# Patient Record
Sex: MALE | Employment: OTHER | URBAN - METROPOLITAN AREA
[De-identification: names, ages, dates, MRNs, and addresses within clinical notes are randomized per-mention and may not be internally consistent; named-entity substitution may affect disease eponyms.]

---

## 2017-02-07 ENCOUNTER — OFFICE VISIT (OUTPATIENT)
Dept: ORTHOPEDIC SURGERY | Age: 72
End: 2017-02-07

## 2017-02-07 VITALS
WEIGHT: 180 LBS | SYSTOLIC BLOOD PRESSURE: 116 MMHG | TEMPERATURE: 98.1 F | DIASTOLIC BLOOD PRESSURE: 68 MMHG | BODY MASS INDEX: 27.28 KG/M2 | HEART RATE: 62 BPM | HEIGHT: 68 IN

## 2017-02-07 DIAGNOSIS — M19.072 PRIMARY OSTEOARTHRITIS OF LEFT ANKLE: Primary | ICD-10-CM

## 2017-02-07 DIAGNOSIS — M25.572 CHRONIC PAIN OF LEFT ANKLE: ICD-10-CM

## 2017-02-07 DIAGNOSIS — G89.29 CHRONIC PAIN OF LEFT ANKLE: ICD-10-CM

## 2017-02-07 DIAGNOSIS — I73.9 PVD (PERIPHERAL VASCULAR DISEASE) (HCC): ICD-10-CM

## 2017-02-07 RX ORDER — ATENOLOL 25 MG/1
25 TABLET ORAL DAILY
COMMUNITY

## 2017-02-07 RX ORDER — TRAMADOL HYDROCHLORIDE 50 MG/1
50 TABLET ORAL
COMMUNITY

## 2017-02-07 RX ORDER — BISMUTH SUBSALICYLATE 262 MG
1 TABLET,CHEWABLE ORAL DAILY
COMMUNITY

## 2017-02-07 RX ORDER — MELOXICAM 15 MG/1
15 TABLET ORAL DAILY
Qty: 30 TAB | Refills: 0 | Status: SHIPPED | OUTPATIENT
Start: 2017-02-07 | End: 2017-02-22 | Stop reason: SDUPTHER

## 2017-02-07 RX ORDER — TAMSULOSIN HYDROCHLORIDE 0.4 MG/1
0.4 CAPSULE ORAL DAILY
COMMUNITY

## 2017-02-07 RX ORDER — DILTIAZEM HYDROCHLORIDE 240 MG/1
CAPSULE, EXTENDED RELEASE ORAL DAILY
COMMUNITY

## 2017-02-07 NOTE — MR AVS SNAPSHOT
Visit Information Date & Time Provider Department Dept. Phone Encounter #  
 2/7/2017  9:40 AM Bernard Eddy, [address] [facility] [phone] [id number] Upcoming Health Maintenance Date Due DTaP/Tdap/Td series (1 - Tdap) 2/11/1964 FOBT Q 1 YEAR AGE 50-75 2/11/1993 ZOSTER VACCINE AGE 60> 2/11/2003 GLAUCOMA SCREENING Q2Y 2/11/2008 Pneumococcal 65+ Low/Medium Risk (1 of 2 - PCV13) 2/11/2008 MEDICARE YEARLY EXAM 2/11/2008 INFLUENZA AGE 9 TO ADULT 8/1/2016 Allergies as of 2/7/2017  Review Complete On: 2/7/2017 By: Chalo Patricia Not on File Current Immunizations  Never Reviewed No immunizations on file. Not reviewed this visit You Were Diagnosed With   
  
 Codes Comments Primary osteoarthritis of left ankle    -  Primary ICD-10-CM: Y20.336 ICD-9-CM: 715.17 Chronic pain of left ankle     ICD-10-CM: M25.572, G89.29 ICD-9-CM: 719.47, 338.29 Vitals BP Pulse Temp Height(growth percentile) Weight(growth percentile) BMI  
 116/68 62 98.1 °F (36.7 °C) (Oral) 5' 8\" (1.727 m) 180 lb (81.6 kg) 27.37 kg/m2 Smoking Status Former Smoker Vitals History BMI and BSA Data Body Mass Index Body Surface Area  
 27.37 kg/m 2 1.98 m 2 Preferred Pharmacy Pharmacy Name Phone [facility] [address], [address] [place] [address] [phone] Your Updated Medication List  
  
   
This list is accurate as of: 2/7/17 10:44 AM.  Always use your most recent med list.  
  
  
  
  
 atenolol 25 mg tablet Commonly known as:  TENORMIN Take 25 mg by mouth daily. COLACE 50 mg capsule Generic drug:  docusate sodium Take 50 mg by mouth two (2) times a day. dilTIAZem  mg XR capsule Commonly known as:  DILACOR XR Take  by mouth daily. multivitamin tablet Commonly known as:  ONE A DAY Take 1 Tab by mouth daily. tamsulosin 0.4 mg capsule Commonly known as:  FLOMAX Take 0.4 mg by mouth daily. traMADol 50 mg tablet Commonly known as:  ULTRAM  
Take 50 mg by mouth every six (6) hours as needed for Pain. We Performed the Following AMB POC XRAY, ANKLE; COMPLETE, 3+ VIE [63645 CPT(R)] Patient Instructions Please follow up after CT. You are advised to contact us if your condition worsens. An CT has been ordered for you. A [facility] will be contacting you to schedule the appointment as soon as it has been approved with your insurance company. Please schedule an appointment to follow up with the doctor or the physicians assistant after the CT has been conducted. Osteoarthritis: Care Instructions Your Care Instructions Arthritis is a common health problem in which the joints are inflamed. There are several kinds of arthritis. Osteoarthritis is caused by a breakdown of cartilage, the hard, thick tissue that cushions the joints. It causes pain, stiffness, and swelling, often in the spine, fingers, hips, and knees. Osteoarthritis can happen at any age, but it is most common in older people. Osteoarthritis never goes away completely, but it can be controlled. Medicine and home treatment can reduce the pain and prevent the arthritis from getting worse. Follow-up care is a key part of your treatment and safety. Be sure to make and go to all appointments, and call your doctor if you are having problems. Its also a good idea to know your test results and keep a list of the medicines you take. How can you care for yourself at home? · Take a warm shower or bath in the morning to relieve stiffness. Avoid sitting still afterwards. · If the joint is not swollen, use moist heat, like a warm, damp towel, for 20 to 30 minutes, 2 or 3 times a day. Do not use heat on a swollen joint.  
· If the joint is swollen, use ice or cold packs for 10 to 20 minutes, once an hour. Cold will help relieve pain and reduce inflammation. Put a thin cloth between the ice and your skin. · To prevent stiffness, gently move the joint through its full range of motion several times a day. · If the joint hurts, avoid activities that put a strain on it for a few days. Take rest breaks throughout the day. · Get regular exercise. Walking, swimming, yoga, biking, and water aerobics are good exercises that are gentle on the joints. · Reach and stay at a healthy weight. If you need to lose or maintain weight, regular exercise and a healthy diet will help. Extra weight can strain the joints, especially the knees and hips, and make the pain worse. Losing even a few pounds may help. · Take pain medicines exactly as directed. ¨ If the doctor gave you a prescription medicine for pain, take it as prescribed. ¨ If you are not taking a prescription pain medicine, ask your doctor if you can take an over-the-counter medicine. When should you call for help? Call your doctor now or seek immediate medical care if: · The pain is so bad that you cannot use the joint. · You have sudden back pain with weakness in your legs or loss of bowel or bladder control. · Your stools are black and tarlike or have streaks of blood. · You have severe pain and swelling in more than one joint. Watch closely for changes in your health, and be sure to contact your doctor if: 
· You have side effects from the medicines, like belly pain, ongoing heartburn, or nausea. · Joint pain continues for more than 6 weeks, and home treatment is not helping. Where can you learn more? Go to http://mark-tobias.info/. Enter K734 in the search box to learn more about \"Osteoarthritis: Care Instructions. \" Current as of: February 24, 2016 Content Version: 11.1 © 8445-4011 [employer].  Care instructions adapted under license by [employer] (which disclaims liability or warranty for this information). If you have questions about a medical condition or this instruction, always ask your healthcare professional. [address] any warranty or liability for your use of this information. Introducing [facility] SERVICES! Chepe Lewis introduces [email or web address] patient portal. Now you can access parts of your medical record, email your doctor's office, and request medication refills online. 1. In your internet browser, go to [email or web address] 2. Click on the First Time User? Click Here link in the Sign In box. You will see the New Member Sign Up page. 3. Enter your [email or web address] Access Code exactly as it appears below. You will not need to use this code after youve completed the sign-up process. If you do not sign up before the expiration date, you must request a new code. · [email or web address] Access Code: [email or web address] Expires: 5/8/2017 10:44 AM 
 
4. Enter the last four digits of your Social Security Number (xxxx) and Date of Birth (mm/dd/yyyy) as indicated and click Submit. You will be taken to the next sign-up page. 5. Create a [email or web address] ID. This will be your [email or web address] login ID and cannot be changed, so think of one that is secure and easy to remember. 6. Create a [email or web address] password. You can change your password at any time. 7. Enter your Password Reset Question and Answer. This can be used at a later time if you forget your password. 8. Enter your e-mail address. You will receive e-mail notification when new information is available in [address] 9. Click Sign Up. You can now view and download portions of your medical record. 10. Click the Download Summary menu link to download a portable copy of your medical information. If you have questions, please visit the Frequently Asked Questions section of the [email or web address] website. Remember, [email or web address] is NOT to be used for urgent needs. For medical emergencies, dial 911. Now available from your iPhone and Android! Please provide this summary of care documentation to your next provider. If you have any questions after today's visit, please call [phone].

## 2017-02-07 NOTE — PROGRESS NOTES
AMBULATORY PROGRESS NOTE      Patient: Kajal Lovell             MRN: [id number]     SSN: [id number] Body mass index is 27.37 kg/(m^2). YOB: 1945     AGE: 66 y.o. EX: male    PCP: No primary care provider on file. IMPRESSION/DIAGNOSIS AND TREATMENT PLAN     DIAGNOSES  1. Primary osteoarthritis of left ankle    2. PVD (peripheral vascular disease) ([facility] [address]    3. Chronic pain of left ankle        Orders Placed This Encounter    [43499] Ankle Min 3V    CT ANKLE LT WO CONT    LOWER EXT ART PVR MULT LEVEL SEG PRESSURES    meloxicam (MOBIC) 15 mg tablet      Kajal Lovell understands his diagnoses and the proposed plan. In this individual who has really failed conservative measures, he has been using an [place] brace and is still having severe pain and discomfort to the anterior portion of his left ankle. He has post-traumatic osteoarthritis of the left ankle. I am ordering a CT scan to assess the talus, as I saw some cystic changes to the posterior portion of talus and some osteoarthritic changes, of course, to the ankle. Upon his next visit, I will obtain weightbearing films of his knees to include the tibia and ankle. Full weight bearing films I would like to see so that I can assess his alignment of his ankle with weightbearing studies. I suspect, in this individual who is failing conservative measures, he is going to require surgery. He may, in fact, require ankle arthrodesis. The issues really are that he has had an anteromedial surgical scar from his ankle many years ago. He has some slight redness of the skin. He does have redness of the skin in this area that has been there for many years now. I cannot palpate the left DP pulse, but I can palpate the PT pulse. So, I think he is going to require noninvasive arterial studies.   Other nonoperative treatment measures may, and/or will, include a cortisone injection, as he has not had any cortisone injections to his ankle at this point in time. Additional recommendations are listed as above and as below. Plan:    1) CT: Left ankle  2) PVL Arterial Study: Assess circulation of BLE  3) Mobic 15 m PO every day; 30 tablets    RTO after CT; Cortisone injection at next visit    HPI [id number] Eryn Stewart IS A 66 y.o. male who presents to my outpatient office complaining of: left ankle pain. The patient presents to the office with his granddaughter, and he is wearing a left custom [place] brace. He reports that he has been having recurrent left tib/fib pain. He reports that the pain has become bad enough that he is unable to sleep. Patient was previously in pain management at [place], but has since been weaned off of 90 mg of morphine. Patient has h/o tibia/fibula fracture with plates and screw placement and removal. Patient sees Dr. Bernadette Marquez [facility] to monitor his circulation. He has an appointment with Dr. Maryam Robbins at 13:00. Filiberto Reece is alert/oriented (name, location, time) and follows commands well. he  is in no acute distress and his affect and mood are appropriate. Left ANKLE and FOOT       Gait: slow  Tenderness: mild across the dorsal aspect of his left tib/fib. Cutaneous: No rashes, skin patches, wounds, or abrasions to the lower legs           Warm and Normal color. No regions of expressible drainage. Medial Border of Tibia Region: absent           Skin color, texture, turgor normal. Normal.    Remote well healed surgical scar; slight redness in the distal tib/fib. Right le+ pitting edema of tib/fib. Venus stasis discoloration of distal 1/3 of tib/fib. Joint Motion: ROM Ankle: decreased  and Hindfoot: (ST,TN,CC Normal}, Forefoot toes:Normal  Neurologic Exam: Neuro: Motor: normal 5/5 strength in all tested muscle groups             Sensory: reduced sensation over well healed left dorsal tib/fib scar.   Contractures: Gastrocnemius or Achilles Contractures absent  Joint / Tendon Stability: No Ankle or Subtalar instability or joint laxity. No peroneal sublux ability or dislocation  Alignment:  Normal Foot Alignment with slight varus and  Flexible  Vascular: Normal Pulses/ NL Capillary refill, No evidence of DVT seen on physical exam.   No calf swelling, no tenderness to calf muscles. Right  DP pulse: +1, TP pulse: +1   Left DP pulse: cannot palpate  Lymphatic:  No Evidence of Lymphedema. CHART REVIEW     No past medical history on file. Current Outpatient Prescriptions   Medication Sig    multivitamin (ONE A DAY) tablet Take 1 Tab by mouth daily.  docusate sodium (COLACE) 50 mg capsule Take 50 mg by mouth two (2) times a day.  tamsulosin (FLOMAX) 0.4 mg capsule Take 0.4 mg by mouth daily.  dilTIAZem XR (DILACOR XR) 240 mg XR capsule Take  by mouth daily.  atenolol (TENORMIN) 25 mg tablet Take 25 mg by mouth daily.  traMADol (ULTRAM) 50 mg tablet Take 50 mg by mouth every six (6) hours as needed for Pain.  meloxicam (MOBIC) 15 mg tablet Take 1 Tab by mouth daily. No current facility-administered medications for this visit. Not on File  No past surgical history on file. Social History     Occupational History    Not on file. Social History Main Topics    Smoking status: Former Smoker    Smokeless tobacco: Not on file    Alcohol use Not on file    Drug use: Not on file    Sexual activity: Not on file     No family history on file. REVIEW OF SYSTEMS : 2/7/2017  ALL BELOW ARE Negative except : SEE HPI       Constitutional: Negative for fever, chills and weight loss. Neg Weigh Loss  Cardiovascular: Negative for chest pain, claudication and leg swelling. SOB, BURROUGHS   Gastrointestinal: Negative for  pain, N/V/D/C, Blood in stool or urine,dysuria, hematuria,        Incontinence, pelvic pain  Musculoskeletal: see HPI. Neurological: Negative for dizziness and weakness.                  Negative for headaches,Visual Changes, Confusion, Seizures,   Psychiatric/Behavioral: Negative for depression, memory loss and substance abuse. Extremities:  Negative for  hair changes, rash or skin lesion changes. Hematologic: Negative for Bleeding problems, bruising, pallor or swollen lymph nodes. Peripheral Vascular: No calf pain, vascular vein tenderness to calf pain              No calf throbbing, posterior knee throbbing pain    DIAGNOSTIC IMAGING      X RAYS AT [address] [place] [address]  2/7/2017    LEFT DISTAL TIB/FIB (DISTAL 1/3)    Bones and Joints: OLD HEALED DISTAL TIBIA AND FIBULA FX'S fractures: EVIDENCE OF PRIOR SCREW HOLES             No subluxations, or No dislocations  Alignment: SLIGHT VALGUS:  Soft Tissues: WNL Normal   Joints: Arthritis:  moderate present TO DORSAL TN REGION (SPURS) AND SOME OA TO ANKLE JOINT. CYSTIC CHANGES TO ANKLE AT POSTERIOR TALUS AND ANKLE  Mineralization: Suggests moderate Osteopenia    I have personally reviewed the results of the above study. The interpretation of this study is my professional opinion.       Abdirashid Jameson MD  2/7/2017  10:14 AM             Written by Kortney Fisher, as dictated by Joanne Arredondo MD.

## 2017-02-07 NOTE — PATIENT INSTRUCTIONS
Please follow up after CT. You are advised to contact us if your condition worsens. An CT has been ordered for you. A [employer] will be contacting you to schedule the appointment as soon as it has been approved with your insurance company. Please schedule an appointment to follow up with the doctor or the physicians assistant after the CT has been conducted. Osteoarthritis: Care Instructions  Your Care Instructions    Arthritis is a common health problem in which the joints are inflamed. There are several kinds of arthritis. Osteoarthritis is caused by a breakdown of cartilage, the hard, thick tissue that cushions the joints. It causes pain, stiffness, and swelling, often in the spine, fingers, hips, and knees. Osteoarthritis can happen at any age, but it is most common in older people. Osteoarthritis never goes away completely, but it can be controlled. Medicine and home treatment can reduce the pain and prevent the arthritis from getting worse. Follow-up care is a key part of your treatment and safety. Be sure to make and go to all appointments, and call your doctor if you are having problems. Its also a good idea to know your test results and keep a list of the medicines you take. How can you care for yourself at home? · Take a warm shower or bath in the morning to relieve stiffness. Avoid sitting still afterwards. · If the joint is not swollen, use moist heat, like a warm, damp towel, for 20 to 30 minutes, 2 or 3 times a day. Do not use heat on a swollen joint. · If the joint is swollen, use ice or cold packs for 10 to 20 minutes, once an hour. Cold will help relieve pain and reduce inflammation. Put a thin cloth between the ice and your skin. · To prevent stiffness, gently move the joint through its full range of motion several times a day. · If the joint hurts, avoid activities that put a strain on it for a few days. Take rest breaks throughout the day. · Get regular exercise. Walking, swimming, yoga, biking, and water aerobics are good exercises that are gentle on the joints. · Reach and stay at a healthy weight. If you need to lose or maintain weight, regular exercise and a healthy diet will help. Extra weight can strain the joints, especially the knees and hips, and make the pain worse. Losing even a few pounds may help. · Take pain medicines exactly as directed. ¨ If the doctor gave you a prescription medicine for pain, take it as prescribed. ¨ If you are not taking a prescription pain medicine, ask your doctor if you can take an over-the-counter medicine. When should you call for help? Call your doctor now or seek immediate medical care if:  · The pain is so bad that you cannot use the joint. · You have sudden back pain with weakness in your legs or loss of bowel or bladder control. · Your stools are black and tarlike or have streaks of blood. · You have severe pain and swelling in more than one joint. Watch closely for changes in your health, and be sure to contact your doctor if:  · You have side effects from the medicines, like belly pain, ongoing heartburn, or nausea. · Joint pain continues for more than 6 weeks, and home treatment is not helping. Where can you learn more? Go to http://mark-tobias.info/. Enter O278 in the search box to learn more about \"Osteoarthritis: Care Instructions. \"  Current as of: February 24, 2016  Content Version: 11.1  © 8415-7172 [employer]. Care instructions adapted under license by [employer] (which disclaims liability or warranty for this information). If you have questions about a medical condition or this instruction, always ask your healthcare professional. [address] any warranty or liability for your use of this information.

## 2017-02-07 NOTE — PROCEDURES
X RAYS AT [address] [place] [address]  2/7/2017    LEFT DISTAL TIB/FIB (DISTAL 1/3)    Bones and Joints: OLD HEALED DISTAL TIBIA AND FIBULA FX'S fractures: EVIDENCE OF PRIOR SCREW HOLES             No subluxations, or No dislocations  Alignment: SLIGHT VALGUS:  Soft Tissues: WNL Normal   Joints: Arthritis:  moderate present TO DORSAL TN REGION (SPURS) AND SOME OA TO ANKLE JOINT. CYSTIC CHANGES TO ANKLE AT POSTERIOR TALUS AND ANKLE  Mineralization: Suggests moderate Osteopenia    I have personally reviewed the results of the above study. The interpretation of this study is my professional opinion.       Lakeisha Barrera MD  2/7/2017  10:14 AM

## 2017-02-09 ENCOUNTER — TELEPHONE (OUTPATIENT)
Dept: ORTHOPEDIC SURGERY | Age: 72
End: 2017-02-09

## 2017-02-09 NOTE — TELEPHONE ENCOUNTER
Dr. Alexa Goetz does not want to prescribe any stronger pain medicine. He can use OTC Asper Cream with Lidocaine or Biofreeze. We can prescribe a different antiinflammatory medicine and/or refer the patient back to pain management. Patient should follow up in the office after CT and PVL studies completed.      Kayden Clark PA-C  2/9/2017   3:25 PM

## 2017-02-09 NOTE — TELEPHONE ENCOUNTER
Milagros Hedrick called on behalf of patient (ok per HIPPA), states current medication needs to be evaluated as patient is still not able to sleep due to pain - Mobin and Ultram is not effective enough and patient has asked her to see if we can prescribe something different. States he can tolerate pain during the day, but not at night.   Please advise Larry Finney at [phone]

## 2017-02-14 ENCOUNTER — HOSPITAL ENCOUNTER (OUTPATIENT)
Dept: CT IMAGING | Age: 72
Discharge: HOME | End: 2017-02-14
Attending: ORTHOPAEDIC SURGERY
Payer: MEDICARE

## 2017-02-14 ENCOUNTER — HOSPITAL ENCOUNTER (OUTPATIENT)
Dept: VASCULAR SURGERY | Age: 72
Discharge: HOME | End: 2017-02-14
Attending: ORTHOPAEDIC SURGERY
Payer: MEDICARE

## 2017-02-14 DIAGNOSIS — G89.29 CHRONIC PAIN OF LEFT ANKLE: ICD-10-CM

## 2017-02-14 DIAGNOSIS — M25.572 CHRONIC PAIN OF LEFT ANKLE: ICD-10-CM

## 2017-02-14 DIAGNOSIS — M19.072 PRIMARY OSTEOARTHRITIS OF LEFT ANKLE: ICD-10-CM

## 2017-02-14 PROCEDURE — 93923 UPR/LXTR ART STDY 3+ LVLS: CPT

## 2017-02-14 PROCEDURE — 73700 CT LOWER EXTREMITY W/O DYE: CPT

## 2017-02-14 NOTE — PROCEDURES
[facility] [facility]  *** FINAL REPORT ***    Name: Waylon Berrios  MRN: [id number]    Outpatient  : 1945  HIS Order #: [id number]  [id number] [facility] Visit #: [id number]  Date: 2017    TYPE OF TEST: Peripheral Arterial Testing    REASON FOR TEST    Right Leg  Segmentals: Normal                     mmHg  Brachial  High thigh  Low thigh  Calf             168  Posterior tibial 177  Dorsalis pedis   135  Peroneal  Metatarsal  Toe pressure     134  Doppler:  Ankle/Brachial: 1.55    Left Leg  Segmentals: Normal                     mmHg  Brachial         114  High thigh  Low thigh  Calf             173  Posterior tibial 174  Dorsalis pedis   159  Peroneal  Metatarsal  Toe pressure     115  Doppler:  Ankle/Brachial: 1.53    INTERPRETATION/FINDINGS  1. No evidence of significant peripheral arterial disease at rest in  the right leg. 2. No evidence of significant peripheral arterial disease at rest in  the left leg. 3. The right ankle/brachial index is 1.55 and the left ankle/brachial  index is 1.53. ADDITIONAL COMMENTS    I have personally reviewed the data relevant to the interpretation of  this  study.     TECHNOLOGIST: Jocelyn Polo, [facility], RVT/  Signed: 2017 11:06 AM    PHYSICIAN: Miquel Breen MD  Signed: 2017 02:28 PM

## 2017-02-22 DIAGNOSIS — G89.29 CHRONIC PAIN OF LEFT ANKLE: ICD-10-CM

## 2017-02-22 DIAGNOSIS — M25.572 CHRONIC PAIN OF LEFT ANKLE: ICD-10-CM

## 2017-02-22 DIAGNOSIS — M19.072 PRIMARY OSTEOARTHRITIS OF LEFT ANKLE: ICD-10-CM

## 2017-02-22 RX ORDER — MELOXICAM 15 MG/1
TABLET ORAL
Qty: 30 TAB | Refills: 0 | Status: SHIPPED | OUTPATIENT
Start: 2017-02-22

## 2017-02-27 ENCOUNTER — OFFICE VISIT (OUTPATIENT)
Dept: ORTHOPEDIC SURGERY | Age: 72
End: 2017-02-27

## 2017-02-27 VITALS
BODY MASS INDEX: 27.37 KG/M2 | DIASTOLIC BLOOD PRESSURE: 89 MMHG | WEIGHT: 180 LBS | HEART RATE: 70 BPM | TEMPERATURE: 98 F | SYSTOLIC BLOOD PRESSURE: 128 MMHG

## 2017-02-27 DIAGNOSIS — I73.9 PVD (PERIPHERAL VASCULAR DISEASE) (HCC): ICD-10-CM

## 2017-02-27 DIAGNOSIS — M19.072 PRIMARY OSTEOARTHRITIS OF LEFT ANKLE: Primary | ICD-10-CM

## 2017-02-27 RX ORDER — HYDROCODONE BITARTRATE AND ACETAMINOPHEN 5; 325 MG/1; MG/1
1-2 TABLET ORAL
Qty: 40 TAB | Refills: 0 | Status: SHIPPED | OUTPATIENT
Start: 2017-02-27

## 2017-02-27 NOTE — PATIENT INSTRUCTIONS
Please follow up in 3 weeks. You are advised to contact us if your condition worsens. Recommended: Miralax for constipation     Osteoarthritis: Care Instructions  Your Care Instructions    Arthritis is a common health problem in which the joints are inflamed. There are several kinds of arthritis. Osteoarthritis is caused by a breakdown of cartilage, the hard, thick tissue that cushions the joints. It causes pain, stiffness, and swelling, often in the spine, fingers, hips, and knees. Osteoarthritis can happen at any age, but it is most common in older people. Osteoarthritis never goes away completely, but it can be controlled. Medicine and home treatment can reduce the pain and prevent the arthritis from getting worse. Follow-up care is a key part of your treatment and safety. Be sure to make and go to all appointments, and call your doctor if you are having problems. Its also a good idea to know your test results and keep a list of the medicines you take. How can you care for yourself at home? · Take a warm shower or bath in the morning to relieve stiffness. Avoid sitting still afterwards. · If the joint is not swollen, use moist heat, like a warm, damp towel, for 20 to 30 minutes, 2 or 3 times a day. Do not use heat on a swollen joint. · If the joint is swollen, use ice or cold packs for 10 to 20 minutes, once an hour. Cold will help relieve pain and reduce inflammation. Put a thin cloth between the ice and your skin. · To prevent stiffness, gently move the joint through its full range of motion several times a day. · If the joint hurts, avoid activities that put a strain on it for a few days. Take rest breaks throughout the day. · Get regular exercise. Walking, swimming, yoga, biking, and water aerobics are good exercises that are gentle on the joints. · Reach and stay at a healthy weight. If you need to lose or maintain weight, regular exercise and a healthy diet will help.  Extra weight can strain the joints, especially the knees and hips, and make the pain worse. Losing even a few pounds may help. · Take pain medicines exactly as directed. ¨ If the doctor gave you a prescription medicine for pain, take it as prescribed. ¨ If you are not taking a prescription pain medicine, ask your doctor if you can take an over-the-counter medicine. When should you call for help? Call your doctor now or seek immediate medical care if:  · The pain is so bad that you cannot use the joint. · You have sudden back pain with weakness in your legs or loss of bowel or bladder control. · Your stools are black and tarlike or have streaks of blood. · You have severe pain and swelling in more than one joint. Watch closely for changes in your health, and be sure to contact your doctor if:  · You have side effects from the medicines, like belly pain, ongoing heartburn, or nausea. · Joint pain continues for more than 6 weeks, and home treatment is not helping. Where can you learn more? Go to http://mark-otbias.info/. Enter Y772 in the search box to learn more about \"Osteoarthritis: Care Instructions. \"  Current as of: February 24, 2016  Content Version: 11.1  © 1107-6290 [employer]. Care instructions adapted under license by [employer] (which disclaims liability or warranty for this information). If you have questions about a medical condition or this instruction, always ask your healthcare professional. [place] [address] any warranty or liability for your use of this information.

## 2017-02-27 NOTE — PROGRESS NOTES
AMBULATORY PROGRESS NOTE      Patient: Kajal Lovell             MRN: [id number]     SSN: [id number] Body mass index is 27.37 kg/(m^2). YOB: 1945     AGE: 74 y.o. EX: male    PCP: Angela Ochoa MD    IMPRESSION/DIAGNOSIS AND TREATMENT PLAN     DIAGNOSES  1. Primary osteoarthritis of left ankle    2. PVD (peripheral vascular disease) ([address]        Orders Placed This Encounter    HYDROcodone-acetaminophen () 5-325 mg per tablet      Kajal Lovell understands his diagnoses and the proposed plan. Plan:    1) Norco 5 m-2 QHS PRN; 40 tablets, 0 refills  2) Recommended Miralax   3) I anticipate a formal pain management consultation, as he used to go to pain management at [facility]. The CT scan of the ankle without contrast showed an old, healed fracture to the distal tibia and fibula. The ankle has some mild osteoarthritic changes. There are some OA changes seen to the medial gutter and lateral gutter, and ossicle seen to the medial malleolus region. There is moderate osteoarthritic changes across the talonavicular region as well. This was done at  [facility], CT done on 2017. RTO - 3 weeks    HPI AND EXAMINATION     Kajal Lovell IS A 74 y.o. male who presents to my outpatient office for follow up of primary osteoarthritis of the left ankle and PVD. At last visit, I ordered a left ankle CT, ordered bilateral PVL arterial studies, and prescribed Mobic 15 mg. I recommended a cortisone injection at last visit. The CT was completed on 2017 and reviewed in the office today. The PVL arterial studies were also reviewed. Patient reports that he continues to have discomfort through left ankle. The patient's daughter states that she removed her father from [facility] pain management because they kept prescribing morphine (associated symptom of constipation) with no relief. She states that she wanted to get a second opinion.      Patient has h/o tibia/fibula fracture with plates and screw placement and removal. Patient sees Dr. April Dye at [facility] to monitor his circulation.     Lisa Betancur is alert/oriented (name, location, time) and follows commands well. he is in no acute distress and his affect and mood are appropriate.      Left ANKLE and FOOT      Gait: slow  Tenderness: mild across the dorsal aspect of his left tib/fib. Cutaneous: No rashes, skin patches, wounds, or abrasions to the lower legs   Warm and Normal color. No regions of expressible drainage. Medial Border of Tibia Region: absent   Skin color, texture, turgor normal. Normal.   Remote well healed surgical scar; slight redness in the distal tib/fib. Right le+ pitting edema of tib/fib. Venus stasis discoloration of distal 1/3 of tib/fib. Joint Motion: ROM Ankle: decreased and Hindfoot: (ST,TN,CC Normal}, Forefoot toes:Normal  Neurologic Exam: Neuro: Motor: normal 5/5 strength in all tested muscle groups   Sensory: reduced sensation over well healed left dorsal tib/fib scar. Contractures: Gastrocnemius or Achilles Contractures absent  Joint / Tendon Stability: No Ankle or Subtalar instability or joint laxity. No peroneal sublux ability or dislocation  Alignment:  Normal Foot Alignment with slight varus and  Flexible  Vascular: Normal Pulses/ NL Capillary refill, No evidence of DVT seen on physical exam.   No calf swelling, no tenderness to calf muscles. Right DP pulse: +1, TP pulse: +1  Left DP pulse: cannot palpate  Lymphatic: No Evidence of Lymphedema. CHART REVIEW     No past medical history on file. Current Outpatient Prescriptions   Medication Sig    HYDROcodone-acetaminophen (NORCO) 5-325 mg per tablet Take 1-2 Tabs by mouth nightly as needed for Pain. Max Daily Amount: 2 Tabs.  multivitamin (ONE A DAY) tablet Take 1 Tab by mouth daily.  docusate sodium (COLACE) 50 mg capsule Take 50 mg by mouth two (2) times a day.     tamsulosin (FLOMAX) 0.4 mg capsule Take 0.4 mg by mouth daily.  dilTIAZem XR (DILACOR XR) 240 mg XR capsule Take  by mouth daily.  atenolol (TENORMIN) 25 mg tablet Take 25 mg by mouth daily.  traMADol (ULTRAM) 50 mg tablet Take 50 mg by mouth every six (6) hours as needed for Pain.  meloxicam (MOBIC) 15 mg tablet take 1 tablet by mouth once daily     No current facility-administered medications for this visit. Not on File  No past surgical history on file. Social History     Occupational History    Not on file. Social History Main Topics    Smoking status: Former Smoker    Smokeless tobacco: Not on file    Alcohol use Not on file    Drug use: Not on file    Sexual activity: Not on file     No family history on file. REVIEW OF SYSTEMS : 2/27/2017  ALL BELOW ARE Negative except : SEE HPI       Constitutional: Negative for fever, chills and weight loss. Neg Weigh Loss  Cardiovascular: Negative for chest pain, claudication and leg swelling. SOB, BURROUGHS   Gastrointestinal: Negative for  pain, N/V/D/C, Blood in stool or urine,dysuria, hematuria,        Incontinence, pelvic pain  Musculoskeletal: see HPI. Neurological: Negative for dizziness and weakness. Negative for headaches,Visual Changes, Confusion, Seizures,   Psychiatric/Behavioral: Negative for depression, memory loss and substance abuse. Extremities:  Negative for  hair changes, rash or skin lesion changes. Hematologic: Negative for Bleeding problems, bruising, pallor or swollen lymph nodes. Peripheral Vascular: No calf pain, vascular vein tenderness to calf pain              No calf throbbing, posterior knee throbbing pain    DIAGNOSTIC IMAGING     CT Results (most recent):    Results from Hospital Encounter encounter on 02/14/17   CT ANKLE LT WO CONT   Narrative CT scan of left ankle, without contrast:        INDICATION:    Chronic pain in left ankle. Primary osteoarthritis.         TECHNIQUE:    Contiguous 2.5 image as the axial sections of left ankle and proximal foot are  obtained without intravenous contrast.    Coronal and sagittal images are reformatted. All CT scans at this facility are performed using dose optimization technique as  appropriate to a  performed  examination, to include automated exposer control,  adjustment mA and / or  KV according to patient size (including appropriate  matching  for site specific examination), or use  of iterative  reconstruction  technique. COMPARISON STUDY: None. FINDINGS:    The study shows periosteal reaction with periosteal bone formation involving the  distal shaft of left tibia,. There are some radiolucent foci, indicating  previous insertion of orthopedic screws and later removal of the orthopedic  screws with remaining tracts. These findings are indicative of previous  internally fixed healed fracture in distal shaft of left tibia. Note is also  made of healed fracture in the distal shaft of left fibula approximately at the  junction of middle third and distal third of this bone. Currently at the left ankle joint there are mild osteoarthritic changes noted. The ankle mortise appears to be of normal shape and spacing. At the inferior aspect of medial malleolus of left tibia there is a corticated  ossicle measuring 6 mm in diameter. Another smaller ossicle measuring 3 mm in  diameter in this area. These ossicles may be due to old nonunited fracture or  accessory ossicles. At the medial aspect of the talus bone adjacent  to  the  ossicles there are osteoarthritic changes with sclerosis and mild spurring  noted. At the subtalar joint there are mild osteoarthritic changes with subchondral  sclerosis but with smooth articular surfaces. At the dome of talus bone there is no evidence of osteochondritis dissecans or  any subchondral cyst. There are mild subchondral sclerotic changes, consistent  with osteoarthritis. In the calcaneus bone there is no focal abnormality.  Noted made of a small to  moderate inferior calcaneal spur and small posterior calcaneal spur. At the talonavicular joint there are moderate osteoarthritic changes  demonstrated. At the calcaneocuboid joint there are minimal subchondral sclerotic changes  without significant arthritic process. The tendons of posterior tibialis, flexor digitorum longus, flexor hallucis  longus, peroneus longus and brevis, anterior tibialis and extensor digitorum  longus) dictated grossly intact. But on the CT scan 'small abnormality in the  tendons cannot be visualized. The calcaneal tendon appears to be of normal size and contour. Note is made of moderate to marked vascular calcifications/plaques in the  visualized distal portion of the posterior tibial artery and its plantar  branches. There are also mild vascular calcified plaques in the dorsalis pedis  artery. IMPRESSIONS:    Evidence of old healed fractures in distal left tibia and fibula. At the left ankle joint there are findings of mild osteoarthritis, as described. At the medial aspect of left ankle joint, inferior to the medial malleolus there  are 2 ossicles, which may be due to old nonunited fractures or accessory  ossicles with associated focal mild to moderate osteoarthritic changes. Moderate osteoarthritis at the talonavicular joint. Additional findings, as above.       [facility]    FINAL REPORT       Name: Jeremy Newby  MRN: [id number] Outpatient  : 1945  HIS Order #: [id number]  [id number] [facility] Visit #: [id number]  Date: 2017     TYPE OF TEST: Peripheral Arterial Testing     REASON FOR TEST     Right Leg  Segmentals: Normal     mmHg  Brachial  High thigh  Low thigh  Calf 168  Posterior tibial 177  Dorsalis pedis 135  Peroneal  Metatarsal  Toe pressure 134  Doppler:  Ankle/Brachial: 1.55     Left Leg  Segmentals: Normal     mmHg  Brachial 114  High thigh  Low thigh  Calf 173  Posterior tibial 174  Dorsalis pedis 159  Peroneal  Metatarsal  Toe pressure 115  Doppler:  Ankle/Brachial: 1.53     INTERPRETATION/FINDINGS  1. No evidence of significant peripheral arterial disease at rest in  the right leg. 2. No evidence of significant peripheral arterial disease at rest in  the left leg.   3. The right ankle/brachial index is 1.55 and the left ankle/brachial  index is 1.53.     ADDITIONAL COMMENTS     I have personally reviewed the data relevant to the interpretation of  this  study.     TECHNOLOGIST: Meryl Robison, [facility], RVT/  Signed: 02/14/2017 11:06 AM     PHYSICIAN: Mehul Sellers MD  Signed: 02/14/2017 02:28 PM    Written by Marge Mark, as dictated by Zeina Ly MD.

## 2017-02-27 NOTE — MR AVS SNAPSHOT
Visit Information Date & Time Provider Department Dept. Phone Encounter #  
 2/27/2017 10:20 AM Manuel Poole MD [facility] [phone] [id number] Upcoming Health Maintenance Date Due DTaP/Tdap/Td series (1 - Tdap) 2/11/1964 FOBT Q 1 YEAR AGE 50-75 2/11/1993 ZOSTER VACCINE AGE 60> 2/11/2003 GLAUCOMA SCREENING Q2Y 2/11/2008 Pneumococcal 65+ Low/Medium Risk (1 of 2 - PCV13) 2/11/2008 MEDICARE YEARLY EXAM 2/11/2008 INFLUENZA AGE 9 TO ADULT 8/1/2016 Allergies as of 2/27/2017  Review Complete On: 2/27/2017 By: Jose R Dooley Not on File Current Immunizations  Never Reviewed No immunizations on file. Not reviewed this visit You Were Diagnosed With   
  
 Codes Comments Primary osteoarthritis of left ankle    -  Primary ICD-10-CM: M34.197 ICD-9-CM: 715.17 PVD (peripheral vascular disease) ([facility]     ICD-10-CM: I73.9 ICD-9-CM: 443. 9 Vitals BP  
  
  
  
  
  
 128/89 (BP 1 Location: Left arm, BP Patient Position: Sitting) Vitals History BMI and BSA Data Body Mass Index Body Surface Area  
 27.37 kg/m 2 1.98 m 2 Preferred Pharmacy Pharmacy Name Phone [facility] [address], [address] [place] [address] [phone] Your Updated Medication List  
  
   
This list is accurate as of: 2/27/17 12:18 PM.  Always use your most recent med list.  
  
  
  
  
 atenolol 25 mg tablet Commonly known as:  TENORMIN Take 25 mg by mouth daily. COLACE 50 mg capsule Generic drug:  docusate sodium Take 50 mg by mouth two (2) times a day. dilTIAZem  mg XR capsule Commonly known as:  DILACOR XR Take  by mouth daily. HYDROcodone-acetaminophen 5-325 mg per tablet Commonly known as:  Kristen Mura Take 1-2 Tabs by mouth nightly as needed for Pain. Max Daily Amount: 2 Tabs. meloxicam 15 mg tablet Commonly known as:  MOBIC  
take 1 tablet by mouth once daily  
  
 multivitamin tablet Commonly known as:  ONE A DAY Take 1 Tab by mouth daily. tamsulosin 0.4 mg capsule Commonly known as:  FLOMAX Take 0.4 mg by mouth daily. traMADol 50 mg tablet Commonly known as:  ULTRAM  
Take 50 mg by mouth every six (6) hours as needed for Pain. Prescriptions Printed Refills HYDROcodone-acetaminophen (NORCO) 5-325 mg per tablet 0 Sig: Take 1-2 Tabs by mouth nightly as needed for Pain. Max Daily Amount: 2 Tabs. Class: Print Route: Oral  
  
Patient Instructions Please follow up in 3 weeks. You are advised to contact us if your condition worsens. Recommended: Miralax for constipation Osteoarthritis: Care Instructions Your Care Instructions Arthritis is a common health problem in which the joints are inflamed. There are several kinds of arthritis. Osteoarthritis is caused by a breakdown of cartilage, the hard, thick tissue that cushions the joints. It causes pain, stiffness, and swelling, often in the spine, fingers, hips, and knees. Osteoarthritis can happen at any age, but it is most common in older people. Osteoarthritis never goes away completely, but it can be controlled. Medicine and home treatment can reduce the pain and prevent the arthritis from getting worse. Follow-up care is a key part of your treatment and safety. Be sure to make and go to all appointments, and call your doctor if you are having problems. Its also a good idea to know your test results and keep a list of the medicines you take. How can you care for yourself at home? · Take a warm shower or bath in the morning to relieve stiffness. Avoid sitting still afterwards. · If the joint is not swollen, use moist heat, like a warm, damp towel, for 20 to 30 minutes, 2 or 3 times a day. Do not use heat on a swollen joint. · If the joint is swollen, use ice or cold packs for 10 to 20 minutes, once an hour. Cold will help relieve pain and reduce inflammation. Put a thin cloth between the ice and your skin. · To prevent stiffness, gently move the joint through its full range of motion several times a day. · If the joint hurts, avoid activities that put a strain on it for a few days. Take rest breaks throughout the day. · Get regular exercise. Walking, swimming, yoga, biking, and water aerobics are good exercises that are gentle on the joints. · Reach and stay at a healthy weight. If you need to lose or maintain weight, regular exercise and a healthy diet will help. Extra weight can strain the joints, especially the knees and hips, and make the pain worse. Losing even a few pounds may help. · Take pain medicines exactly as directed. ¨ If the doctor gave you a prescription medicine for pain, take it as prescribed. ¨ If you are not taking a prescription pain medicine, ask your doctor if you can take an over-the-counter medicine. When should you call for help? Call your doctor now or seek immediate medical care if: · The pain is so bad that you cannot use the joint. · You have sudden back pain with weakness in your legs or loss of bowel or bladder control. · Your stools are black and tarlike or have streaks of blood. · You have severe pain and swelling in more than one joint. Watch closely for changes in your health, and be sure to contact your doctor if: 
· You have side effects from the medicines, like belly pain, ongoing heartburn, or nausea. · Joint pain continues for more than 6 weeks, and home treatment is not helping. Where can you learn more? Go to http://mark-tobias.info/. Enter S415 in the search box to learn more about \"Osteoarthritis: Care Instructions. \" Current as of: February 24, 2016 Content Version: 11.1 © 9169-5275 [employer], [employer].  Care instructions adapted under license by [address] (which disclaims liability or warranty for this information). If you have questions about a medical condition or this instruction, always ask your healthcare professional. [address] any warranty or liability for your use of this information. Introducing [facility]! Karena Mendenhall introduces Talentoday patient portal. Now you can access parts of your medical record, email your doctor's office, and request medication refills online. 1. In your internet browser, go to [email or web address] 2. Click on the First Time User? Click Here link in the Sign In box. You will see the New Member Sign Up page. 3. Enter your Talentoday Access Code exactly as it appears below. You will not need to use this code after youve completed the sign-up process. If you do not sign up before the expiration date, you must request a new code. · Talentoday Access Code: [email or web address] Expires: 5/8/2017 10:44 AM 
 
4. Enter the last four digits of your Social Security Number (xxxx) and Date of Birth (mm/dd/yyyy) as indicated and click Submit. You will be taken to the next sign-up page. 5. Create a Talentoday ID. This will be your Talentoday login ID and cannot be changed, so think of one that is secure and easy to remember. 6. Create a Talentoday password. You can change your password at any time. 7. Enter your Password Reset Question and Answer. This can be used at a later time if you forget your password. 8. Enter your e-mail address. You will receive e-mail notification when new information is available in [address] 9. Click Sign Up. You can now view and download portions of your medical record. 10. Click the Download Summary menu link to download a portable copy of your medical information. If you have questions, please visit the Frequently Asked Questions section of the [employer] website.  Remember, [employer]oday is NOT to be used for urgent needs. For medical emergencies, dial 911. Now available from your iPhone and Android! Please provide this summary of care documentation to your next provider. Your primary care clinician is listed as Rishi Hunter. If you have any questions after today's visit, please call [phone].

## 2017-03-21 ENCOUNTER — OFFICE VISIT (OUTPATIENT)
Dept: ORTHOPEDIC SURGERY | Age: 72
End: 2017-03-21

## 2017-03-21 VITALS
SYSTOLIC BLOOD PRESSURE: 113 MMHG | WEIGHT: 178.6 LBS | HEIGHT: 68 IN | BODY MASS INDEX: 27.07 KG/M2 | HEART RATE: 89 BPM | TEMPERATURE: 98 F | DIASTOLIC BLOOD PRESSURE: 75 MMHG

## 2017-03-21 DIAGNOSIS — I73.9 PVD (PERIPHERAL VASCULAR DISEASE) (HCC): ICD-10-CM

## 2017-03-21 DIAGNOSIS — M19.072 PRIMARY OSTEOARTHRITIS OF LEFT ANKLE: Primary | ICD-10-CM

## 2017-03-21 DIAGNOSIS — G89.4 CHRONIC PAIN SYNDROME: ICD-10-CM

## 2017-03-21 RX ORDER — HYDROCODONE BITARTRATE AND ACETAMINOPHEN 5; 325 MG/1; MG/1
1-2 TABLET ORAL
Qty: 40 TAB | Refills: 0 | Status: SHIPPED | OUTPATIENT
Start: 2017-03-21 | End: 2017-04-10 | Stop reason: SDUPTHER

## 2017-03-21 NOTE — PATIENT INSTRUCTIONS
Please follow up in 2 months. You are advised to contact us if your condition worsens. Osteoarthritis: Care Instructions  Your Care Instructions    Arthritis is a common health problem in which the joints are inflamed. There are several kinds of arthritis. Osteoarthritis is caused by a breakdown of cartilage, the hard, thick tissue that cushions the joints. It causes pain, stiffness, and swelling, often in the spine, fingers, hips, and knees. Osteoarthritis can happen at any age, but it is most common in older people. Osteoarthritis never goes away completely, but it can be controlled. Medicine and home treatment can reduce the pain and prevent the arthritis from getting worse. Follow-up care is a key part of your treatment and safety. Be sure to make and go to all appointments, and call your doctor if you are having problems. Its also a good idea to know your test results and keep a list of the medicines you take. How can you care for yourself at home? · Take a warm shower or bath in the morning to relieve stiffness. Avoid sitting still afterwards. · If the joint is not swollen, use moist heat, like a warm, damp towel, for 20 to 30 minutes, 2 or 3 times a day. Do not use heat on a swollen joint. · If the joint is swollen, use ice or cold packs for 10 to 20 minutes, once an hour. Cold will help relieve pain and reduce inflammation. Put a thin cloth between the ice and your skin. · To prevent stiffness, gently move the joint through its full range of motion several times a day. · If the joint hurts, avoid activities that put a strain on it for a few days. Take rest breaks throughout the day. · Get regular exercise. Walking, swimming, yoga, biking, and water aerobics are good exercises that are gentle on the joints. · Reach and stay at a healthy weight. If you need to lose or maintain weight, regular exercise and a healthy diet will help.  Extra weight can strain the joints, especially the knees and hips, and make the pain worse. Losing even a few pounds may help. · Take pain medicines exactly as directed. ¨ If the doctor gave you a prescription medicine for pain, take it as prescribed. ¨ If you are not taking a prescription pain medicine, ask your doctor if you can take an over-the-counter medicine. When should you call for help? Call your doctor now or seek immediate medical care if:  · The pain is so bad that you cannot use the joint. · You have sudden back pain with weakness in your legs or loss of bowel or bladder control. · Your stools are black and tarlike or have streaks of blood. · You have severe pain and swelling in more than one joint. Watch closely for changes in your health, and be sure to contact your doctor if:  · You have side effects from the medicines, like belly pain, ongoing heartburn, or nausea. · Joint pain continues for more than 6 weeks, and home treatment is not helping. Where can you learn more? Go to http://mark-tobias.info/. Enter D702 in the search box to learn more about \"Osteoarthritis: Care Instructions. \"  Current as of: February 24, 2016  Content Version: 11.1  © 4665-2104 Healthwise, Incorporated. Care instructions adapted under license by [employer] (which disclaims liability or warranty for this information). If you have questions about a medical condition or this instruction, always ask your healthcare professional. [place] [address] any warranty or liability for your use of this information.

## 2017-03-21 NOTE — MR AVS SNAPSHOT
Visit Information Date & Time Provider Department Dept. Phone Encounter #  
 3/21/2017  3:50 PM Nilesh Price, [address] [facility] [phone] Upcoming Health Maintenance Date Due DTaP/Tdap/Td series (1 - Tdap) 2/11/1964 FOBT Q 1 YEAR AGE 50-75 2/11/1993 ZOSTER VACCINE AGE 60> 2/11/2003 GLAUCOMA SCREENING Q2Y 2/11/2008 Pneumococcal 65+ Low/Medium Risk (1 of 2 - PCV13) 2/11/2008 MEDICARE YEARLY EXAM 2/11/2008 INFLUENZA AGE 9 TO ADULT 8/1/2016 Allergies as of 3/21/2017  Review Complete On: 3/21/2017 By: Zayra Saleh No Known Allergies Current Immunizations  Never Reviewed No immunizations on file. Not reviewed this visit You Were Diagnosed With   
  
 Codes Comments Primary osteoarthritis of left ankle    -  Primary ICD-10-CM: X99.861 ICD-9-CM: 715.17 PVD [facility]peripheral vascular disease) ([facility]     ICD-10-CM: I73.9 ICD-9-CM: 443. 9 Chronic pain syndrome     ICD-10-CM: G89.4 ICD-9-CM: 338. 4 Vitals BP Pulse Temp Height(growth percentile) Weight(growth percentile) BMI  
 113/75 89 98 °F (36.7 °C) (Oral) 5' 8\" (1.727 m) 178 lb 9.6 oz (81 kg) 27.16 kg/m2 Smoking Status Former Smoker BMI and BSA Data Body Mass Index Body Surface Area  
 27.16 kg/m 2 1.97 m 2 Preferred Pharmacy Pharmacy Name Phone [facility] [address], [address] [place] [address] [phone] Your Updated Medication List  
  
   
This list is accurate as of: 3/21/17  5:11 PM.  Always use your most recent med list.  
  
  
  
  
 atenolol 25 mg tablet Commonly known as:  TENORMIN Take 25 mg by mouth daily. COLACE 50 mg capsule Generic drug:  docusate sodium Take 50 mg by mouth two (2) times a day. dilTIAZem  mg XR capsule Commonly known as:  DILACOR XR Take  by mouth daily. HYDROcodone-acetaminophen 5-325 mg per tablet Commonly known as:  Rolinda Doles Take 1-2 Tabs by mouth nightly as needed for Pain. Max Daily Amount: 2 Tabs. meloxicam 15 mg tablet Commonly known as:  MOBIC  
take 1 tablet by mouth once daily  
  
 multivitamin tablet Commonly known as:  ONE A DAY Take 1 Tab by mouth daily. tamsulosin 0.4 mg capsule Commonly known as:  FLOMAX Take 0.4 mg by mouth daily. traMADol 50 mg tablet Commonly known as:  ULTRAM  
Take 50 mg by mouth every six (6) hours as needed for Pain. Patient Instructions Please follow up in 2 months. You are advised to contact us if your condition worsens. Osteoarthritis: Care Instructions Your Care Instructions Arthritis is a common health problem in which the joints are inflamed. There are several kinds of arthritis. Osteoarthritis is caused by a breakdown of cartilage, the hard, thick tissue that cushions the joints. It causes pain, stiffness, and swelling, often in the spine, fingers, hips, and knees. Osteoarthritis can happen at any age, but it is most common in older people. Osteoarthritis never goes away completely, but it can be controlled. Medicine and home treatment can reduce the pain and prevent the arthritis from getting worse. Follow-up care is a key part of your treatment and safety. Be sure to make and go to all appointments, and call your doctor if you are having problems. Its also a good idea to know your test results and keep a list of the medicines you take. How can you care for yourself at home? · Take a warm shower or bath in the morning to relieve stiffness. Avoid sitting still afterwards. · If the joint is not swollen, use moist heat, like a warm, damp towel, for 20 to 30 minutes, 2 or 3 times a day. Do not use heat on a swollen joint.  
· If the joint is swollen, use ice or cold packs for 10 to 20 minutes, once an hour. Cold will help relieve pain and reduce inflammation. Put a thin cloth between the ice and your skin. · To prevent stiffness, gently move the joint through its full range of motion several times a day. · If the joint hurts, avoid activities that put a strain on it for a few days. Take rest breaks throughout the day. · Get regular exercise. Walking, swimming, yoga, biking, and water aerobics are good exercises that are gentle on the joints. · Reach and stay at a healthy weight. If you need to lose or maintain weight, regular exercise and a healthy diet will help. Extra weight can strain the joints, especially the knees and hips, and make the pain worse. Losing even a few pounds may help. · Take pain medicines exactly as directed. ¨ If the doctor gave you a prescription medicine for pain, take it as prescribed. ¨ If you are not taking a prescription pain medicine, ask your doctor if you can take an over-the-counter medicine. When should you call for help? Call your doctor now or seek immediate medical care if: · The pain is so bad that you cannot use the joint. · You have sudden back pain with weakness in your legs or loss of bowel or bladder control. · Your stools are black and tarlike or have streaks of blood. · You have severe pain and swelling in more than one joint. Watch closely for changes in your health, and be sure to contact your doctor if: 
· You have side effects from the medicines, like belly pain, ongoing heartburn, or nausea. · Joint pain continues for more than 6 weeks, and home treatment is not helping. Where can you learn more? Go to http://mark-tobias.info/. Enter E466 in the search box to learn more about \"Osteoarthritis: Care Instructions. \" Current as of: February 24, 2016 Content Version: 11.1 © 5491-4497 [employer].  Care instructions adapted under license by [employer] (which disclaims liability or warranty for this information). If you have questions about a medical condition or this instruction, always ask your healthcare professional. [address] any warranty or liability for your use of this information. Introducing [facility]! Leigh Huerta introduces [facility] patient portal. Now you can access parts of your medical record, email your doctor's office, and request medication refills online. 1. In your internet browser, go to [email or web address] 2. Click on the First Time User? Click Here link in the Sign In box. You will see the New Member Sign Up page. 3. Enter your [facility] Access Code exactly as it appears below. You will not need to use this code after youve completed the sign-up process. If you do not sign up before the expiration date, you must request a new code. · [facility] Access Code: [email or web address] Expires: 5/8/2017 11:44 AM 
 
4. Enter the last four digits of your Social Security Number (xxxx) and Date of Birth (mm/dd/yyyy) as indicated and click Submit. You will be taken to the next sign-up page. 5. Create a [facility] ID. This will be your [facility] login ID and cannot be changed, so think of one that is secure and easy to remember. 6. Create a [facility] password. You can change your password at any time. 7. Enter your Password Reset Question and Answer. This can be used at a later time if you forget your password. 8. Enter your e-mail address. You will receive e-mail notification when new information is available in [address] 9. Click Sign Up. You can now view and download portions of your medical record. 10. Click the Download Summary menu link to download a portable copy of your medical information. If you have questions, please visit the Frequently Asked Questions section of the [facility] website. Remember, [facility] is NOT to be used for urgent needs. For medical emergencies, dial 911. Now available from your iPhone and Android! Please provide this summary of care documentation to your next provider. Your primary care clinician is listed as Rishi Hunter. If you have any questions after today's visit, please call [phone].

## 2017-03-21 NOTE — PROGRESS NOTES
AMBULATORY PROGRESS NOTE      Patient: Sharlene Mcmahan             MRN: [id number]     SSN: [id number] Body mass index is 27.16 kg/(m^2). YOB: 1945     AGE: 74 y.o. EX: male    PCP: Yvonne Doherty MD    IMPRESSION/DIAGNOSIS AND TREATMENT PLAN     DIAGNOSES  1. Primary osteoarthritis of left ankle    2. PVD (peripheral vascular disease) ([facility]    3. Chronic pain syndrome        Orders Placed This Encounter    REFERRAL TO PAIN MANAGEMENT    HYDROcodone-acetaminophen (NORCO) 5-325 mg per tablet      Sharlene Mcmahan understands his diagnoses and the proposed plan. Plan:    1) Referral to pain management  2) Norco 5 m-2 PO BID PRN; 50 tablets, 0 refills    RTO - 2 months    HPI AND EXAMINATION     Sharlene Mcmahan IS A 74 y.o. male who presents to my outpatient office for follow up of left ankle osteoarthritis and PVD. At last visit, I prescribed Norco 5 mg, recommended Miralax, and I anticipated a pain management referral. The daughter removed her father from pain management, because she did not feel it was helping him. The patient presents to the office today with his niece. He reports that he continues to have pain. He rates his pain 8/10. Patient is wearing his custom left [place] brace. This will be the last prescription provided to the patient for narcotics. Patient has h/o tibia/fibula fracture with plates and screw placement and removal. Patient sees Dr. Shai Fiore [facility] to monitor his circulation.      Sharlene Mcmahan is alert/oriented (name, location, time) and follows commands well. he is in no acute distress and his affect and mood are appropriate. [place] brace in place left ankle.      Left ANKLE and FOOT       Gait: slow  Tenderness: mild across the dorsal aspect of his left tib/fib. Cutaneous: No rashes, skin patches, wounds, or abrasions to the lower legs   Warm and Normal color. No regions of expressible drainage.    Medial Border of Tibia Region: absent   Skin color, texture, turgor normal. Normal.   Remote well healed surgical scar; slight redness in the distal tib/fib. Right le+ pitting edema of tib/fib. Venus stasis discoloration of distal 1/3 of tib/fib. Joint Motion: ROM Ankle: decreased and Hindfoot: (ST,TN,CC Normal}, Forefoot toes:Normal  Neurologic Exam: Neuro: Motor: normal 5/5 strength in all tested muscle groups    Sensory: reduced sensation over well healed left dorsal tib/fib scar. Contractures: Gastrocnemius or Achilles Contractures absent  Joint / Tendon Stability: No Ankle or Subtalar instability or joint laxity. No peroneal sublux ability or dislocation  Alignment: Normal Foot Alignment with slight varus and Flexible  Vascular: Normal Pulses/ NL Capillary refill, No evidence of DVT seen on physical exam.   No calf swelling, no tenderness to calf muscles. Right DP pulse: +1, TP pulse: +1  Left DP pulse: cannot palpate  Lymphatic: No Evidence of Lymphedema.     CHART REVIEW     Past Medical History:   Diagnosis Date    Hypertension      Current Outpatient Prescriptions   Medication Sig    HYDROcodone-acetaminophen (NORCO) 5-325 mg per tablet Take 1-2 Tabs by mouth two (2) times daily as needed for Pain. Max Daily Amount: 4 Tabs.  HYDROcodone-acetaminophen (NORCO) 5-325 mg per tablet Take 1-2 Tabs by mouth nightly as needed for Pain. Max Daily Amount: 2 Tabs.  dilTIAZem XR (DILACOR XR) 240 mg XR capsule Take  by mouth daily.  atenolol (TENORMIN) 25 mg tablet Take 25 mg by mouth daily.  meloxicam (MOBIC) 15 mg tablet take 1 tablet by mouth once daily    multivitamin (ONE A DAY) tablet Take 1 Tab by mouth daily.  docusate sodium (COLACE) 50 mg capsule Take 50 mg by mouth two (2) times a day.  tamsulosin (FLOMAX) 0.4 mg capsule Take 0.4 mg by mouth daily.  traMADol (ULTRAM) 50 mg tablet Take 50 mg by mouth every six (6) hours as needed for Pain. No current facility-administered medications for this visit.       No Known Allergies  History reviewed. No pertinent surgical history. Social History     Occupational History    Not on file. Social History Main Topics    Smoking status: Former Smoker    Smokeless tobacco: Not on file    Alcohol use No    Drug use: No    Sexual activity: Not on file     History reviewed. No pertinent family history. REVIEW OF SYSTEMS : 3/21/2017  ALL BELOW ARE Negative except : SEE HPI       Constitutional: Negative for fever, chills and weight loss. Neg Weigh Loss  Cardiovascular: Negative for chest pain, claudication and leg swelling. SOB, BURROUGHS   Gastrointestinal: Negative for  pain, N/V/D/C, Blood in stool or urine,dysuria, hematuria,        Incontinence, pelvic pain  Musculoskeletal: see HPI. Neurological: Negative for dizziness and weakness. Negative for headaches,Visual Changes, Confusion, Seizures,   Psychiatric/Behavioral: Negative for depression, memory loss and substance abuse. Extremities:  Negative for  hair changes, rash or skin lesion changes. Hematologic: Negative for Bleeding problems, bruising, pallor or swollen lymph nodes.   Peripheral Vascular: No calf pain, vascular vein tenderness to calf pain              No calf throbbing, posterior knee throbbing pain    DIAGNOSTIC IMAGING     No notes on file    Written by Parris Leon, as dictated by Ambrose Wheeler MD.

## 2017-04-10 DIAGNOSIS — M19.072 PRIMARY OSTEOARTHRITIS OF LEFT ANKLE: ICD-10-CM

## 2017-04-10 DIAGNOSIS — G89.4 CHRONIC PAIN SYNDROME: ICD-10-CM

## 2017-04-10 DIAGNOSIS — I73.9 PVD (PERIPHERAL VASCULAR DISEASE) (HCC): ICD-10-CM

## 2017-04-10 RX ORDER — HYDROCODONE BITARTRATE AND ACETAMINOPHEN 5; 325 MG/1; MG/1
1-2 TABLET ORAL
Qty: 28 TAB | Refills: 0 | Status: SHIPPED | OUTPATIENT
Start: 2017-04-10 | End: 2017-04-20 | Stop reason: SDUPTHER

## 2017-04-10 NOTE — TELEPHONE ENCOUNTER
Patient's caregiver Tomas Lange (ok per HIPPA) notified that patient's Rx ready for pickup at [facility] office.

## 2017-04-10 NOTE — TELEPHONE ENCOUNTER
We will provide one final prescription refill for Norco 5/325 (14 day supply).        Cara Stark PA-C  4/10/2017

## 2017-04-10 NOTE — TELEPHONE ENCOUNTER
The patient fears he will run out of medication prior to his appointment with pain management. Last Visit: 03/21/2017 with MD Sorenson    Next Appointment: noted to f/u in 2 months   Previous Refill Encounters: 03/21/2017 per MD Sorenson #[id number]     Requested Prescriptions     Pending Prescriptions Disp Refills    HYDROcodone-acetaminophen (NORCO) 5-325 mg per tablet 40 Tab 0     Sig: Take 1-2 Tabs by mouth two (2) times daily as needed for Pain. Max Daily Amount: 4 Tabs.

## 2017-04-20 DIAGNOSIS — I73.9 PVD (PERIPHERAL VASCULAR DISEASE) (HCC): ICD-10-CM

## 2017-04-20 DIAGNOSIS — M19.072 PRIMARY OSTEOARTHRITIS OF LEFT ANKLE: ICD-10-CM

## 2017-04-20 DIAGNOSIS — G89.4 CHRONIC PAIN SYNDROME: ICD-10-CM

## 2017-04-20 NOTE — TELEPHONE ENCOUNTER
Brandy Ashley, on HIPAA, states they have not heard anything from pain management as of yet. She is requesting a one month supply of medication to (1) \"avoid frequent trips from [place]" and (2) hopefully provide enough medication until they hear from pain management. Previous refill encounter indicates Chiqui Crews [place] provided one final refill to the patient on April 10th for a 14 day supply. Please advise. Last Visit: 03/21/2017 with MD Sorenson    Next Appointment: noted to f/u in 2 months; referred to PM  Previous Refill Encounters: 04/10/2017 per PATRICIA Chow #[id number]     Requested Prescriptions     Pending Prescriptions Disp Refills    HYDROcodone-acetaminophen (NORCO) 5-325 mg per tablet 28 Tab 0     Sig: Take 1-2 Tabs by mouth two (2) times daily as needed for Pain. Max Daily Amount: 4 Tabs.  DO NOT FILL BEFORE 04/24/2017

## 2017-04-21 RX ORDER — HYDROCODONE BITARTRATE AND ACETAMINOPHEN 5; 325 MG/1; MG/1
1-2 TABLET ORAL
Qty: 28 TAB | Refills: 0 | Status: SHIPPED | OUTPATIENT
Start: 2017-04-24
